# Patient Record
Sex: FEMALE | Race: WHITE | NOT HISPANIC OR LATINO | Employment: UNEMPLOYED | URBAN - METROPOLITAN AREA
[De-identification: names, ages, dates, MRNs, and addresses within clinical notes are randomized per-mention and may not be internally consistent; named-entity substitution may affect disease eponyms.]

---

## 2023-11-01 ENCOUNTER — MEDICAL CORRESPONDENCE (OUTPATIENT)
Dept: HEALTH INFORMATION MANAGEMENT | Facility: CLINIC | Age: 63
End: 2023-11-01
Payer: COMMERCIAL

## 2023-11-05 ENCOUNTER — HOSPITAL ENCOUNTER (EMERGENCY)
Facility: CLINIC | Age: 63
Discharge: HOME | End: 2023-11-05
Attending: EMERGENCY MEDICINE | Admitting: EMERGENCY MEDICINE
Payer: COMMERCIAL

## 2023-11-05 VITALS
SYSTOLIC BLOOD PRESSURE: 149 MMHG | HEART RATE: 78 BPM | DIASTOLIC BLOOD PRESSURE: 79 MMHG | RESPIRATION RATE: 16 BRPM | TEMPERATURE: 98.1 F | OXYGEN SATURATION: 98 %

## 2023-11-05 DIAGNOSIS — F41.9 ANXIETY: ICD-10-CM

## 2023-11-05 PROBLEM — M81.0 AGE-RELATED OSTEOPOROSIS WITHOUT CURRENT PATHOLOGICAL FRACTURE: Status: ACTIVE | Noted: 2022-05-26

## 2023-11-05 PROBLEM — F32.A DEPRESSION: Status: ACTIVE | Noted: 2023-07-17

## 2023-11-05 PROBLEM — Z86.718 PERSONAL HISTORY OF DVT (DEEP VEIN THROMBOSIS): Status: ACTIVE | Noted: 2021-05-07

## 2023-11-05 PROBLEM — F33.42 MAJOR DEPRESSIVE DISORDER, RECURRENT EPISODE, IN FULL REMISSION (H): Status: ACTIVE | Noted: 2023-11-05

## 2023-11-05 PROBLEM — D12.6 TUBULAR ADENOMA OF COLON: Status: ACTIVE | Noted: 2021-01-26

## 2023-11-05 PROBLEM — F33.2 SEVERE EPISODE OF RECURRENT MAJOR DEPRESSIVE DISORDER (H): Status: ACTIVE | Noted: 2020-01-08

## 2023-11-05 PROBLEM — F32.9 MAJOR DEPRESSIVE DISORDER WITH SINGLE EPISODE: Status: ACTIVE | Noted: 2023-08-01

## 2023-11-05 PROBLEM — F41.0 PANIC DISORDER: Status: ACTIVE | Noted: 2023-07-13

## 2023-11-05 PROCEDURE — 99283 EMERGENCY DEPT VISIT LOW MDM: CPT | Performed by: EMERGENCY MEDICINE

## 2023-11-05 PROCEDURE — 99283 EMERGENCY DEPT VISIT LOW MDM: CPT | Mod: GC | Performed by: EMERGENCY MEDICINE

## 2023-11-05 RX ORDER — CALCIUM CARBONATE 600 MG
TABLET ORAL DAILY
COMMUNITY

## 2023-11-05 RX ORDER — OLANZAPINE 5 MG/1
5 TABLET ORAL AT BEDTIME
COMMUNITY

## 2023-11-05 RX ORDER — SERTRALINE HYDROCHLORIDE 100 MG/1
100 TABLET, FILM COATED ORAL 2 TIMES DAILY
COMMUNITY

## 2023-11-05 RX ORDER — ALENDRONATE SODIUM 70 MG/1
70 TABLET ORAL
COMMUNITY
Start: 2023-08-15

## 2023-11-05 ASSESSMENT — ACTIVITIES OF DAILY LIVING (ADL)
ADLS_ACUITY_SCORE: 35
ADLS_ACUITY_SCORE: 35

## 2023-11-05 ASSESSMENT — ENCOUNTER SYMPTOMS: NERVOUS/ANXIOUS: 1

## 2023-11-05 NOTE — ED NOTES
Bed: URE-A  Expected date: 11/5/23  Expected time:   Means of arrival:   Comments:  N729 62 y/o female anxiety

## 2023-11-05 NOTE — DISCHARGE INSTRUCTIONS
"Aftercare Plan  If I am feeling unsafe or I am in a crisis, I will:   Contact my established care providers   Call the National Suicide Prevention Lifeline: 988  Go to the nearest emergency room   Call 911     Warning signs that I or other people might notice when a crisis is developing for me: panic becomes overwhelming and uncontrollable    Things I am able to do on my own to cope or help me feel better: call my sister     Changes I can make to support my mental health and wellness: follow through with the steps outlined below     People in my life that I can ask for help: my sister, group home staff     Your Counts include 234 beds at the [facility] has a mental health crisis team you can call 24/7: [facility] Mobile Crisis  [phone]     Other things that are important when I'm in crisis: I need help to slow things down so I can identify solutions.  If I have another panic attack I will utilize my PRN.  If I still feel that the panic is unbearable I will call 911 again or I will call the crisis line noted above.    Monday 11/6/23 I will:  Talk to the nurse at the group [place]  Ask her to help me contact the following people:  Dr. Puma Chase who both at [facility] to discuss medication adjustment/evaluation for help with food issue.  I will ask for a referral for a therapist also.  Talk to \"Herson\" at the [facility] for help with contacting medical assistance office about my application       Crisis Lines  Crisis Text Line  Text 450114  You will be connected with a trained live crisis counselor to provide support.    Por espanol, texto  FERCHO a 476217 o texto a 442-AYUDAME en WhatsApp    The [employer] (LGBTQ Youth Crisis Line)  7.330.705.8202  text START to 260-807      Community Resources  Fast Tracker  Linking people to mental health and substance use disorder resources  [employer]ymn.org     [place] Mental Health Warm Line  Peer to peer support  Monday thru Saturday, 12 pm to 10 pm  908.322.2123 or 1.742.158.9656  " "Text \"Support\" to 54153    National [place] [employer] Mental Illness (BROOKE)  486.659.4416 or 1.888.BROOKE.HELPS      Mental Health Apps  My3  https://ClassWallet.org/    VirtualHopeBox  https://Ryan/apps/virtual-hope-box/      Additional Information  Today you were seen by a licensed mental health professional through Triage and Transition services, [facility] ([facility])  for a crisis assessment in the Emergency Department at [facility].  It is recommended that you follow up with your established providers (psychiatrist, mental health therapist, and/or primary care doctor - as relevant) as soon as possible. Coordinators from [facility] will be calling you in the next 24-48 hours to ensure that you have the resources you need.  You can also contact [facility] coordinators directly at [phone]. You may have been scheduled for or offered an appointment with a mental health provider. [facility] maintains an extensive network of licensed behavioral health providers to connect patients with the services they need.  We do not charge providers a fee to participate in our referral network.  We match patients with providers based on a patient's specific needs, insurance coverage, and location.  Our first effort will be to refer you to a provider within your care system, and will utilize providers outside your care system as needed.         "

## 2023-11-05 NOTE — CONSULTS
"Diagnostic Evaluation Consultation  Crisis Assessment    Patient Name: Toyin Ruiz  Age:  63 year old  Legal Sex: female  Gender Identity: female  Pronouns:   Race: White  Ethnicity: Not  or   Language: English      Patient was assessed: In person      Patient location: [facility] EMERGENCY DEPARTMENT                             URE-A    Referral Data and Chief Complaint  Toyin Ruiz presents to the ED via EMS. Patient is presenting to the ED for the following concerns: Anxiety, Paranoia.   Factors that make the mental health crisis life threatening or complex are:  Pt presents to the ED today due to uncontrollable anxiety/panic attack.  She reports that she is \"worried about everything\" and outlines several presenting issues:  she is fearful that her application for medical assistance was filled out inaccurately and suspects that she has been disqualified.  Pt states that she \"does not like food\" stating that she has hunger pains, but when she eats those pains only get worse.  At the same time, she states that she hates food and she is terrified that she is going to die because she does not eat enough.  She is very paranoid that when she is discharged from the group home, next week, she will die because she does not like to cook, and food does not taste good to her.  Pt reports that \"at least at the group home the meals are balanced and prepared for you, so you don't have to think about it.\"  Pt states that she does not have anyone to help her, stating that she is \"all alone.\"  Pt denies SI/HI/SIB at this time, reporting that she is \"terrified of death.\"  Pt denies AH/VH.  Pt also reports that she is \"going to run out of money in 10 months\" and she does not know how to access funds that may be available to her.  She states that she is \"terrified of technology\" and that she does not \"know how to do anything, and everything is automated.\"  Pt very disorganized, thought processes " "tangential.  At this time, pt does not believe that hospitalization is necessary and she is questioning \"why did I even come here, you can't help me.\".      Informed Consent and Assessment Methods  Explained the crisis assessment process, including applicable information disclosures and limits to confidentiality, assessed understanding of the process, and obtained consent to proceed with the assessment.  Assessment methods included conducting a formal interview with patient, review of medical records, collaboration with medical staff, and obtaining relevant collateral information from family and community providers when available.  : done     Patient response to interventions: verbalizes understanding (After much time and repetitiveness pt was receptive to 4 steps laid out in Aftercare plan for discharge.)  Coping skills were attempted to reduce the crisis:  Pt utilized PRN     History of the Crisis   Pt has a history of ERMA, MDD, and panic disorder.  She is currently living in a transitional group home as a step down from inpatient mental health treatment at [facility].  She was a pt there July-August 2023.  Pt will be discharged from the group home next week and she has been extremely anxious about discharge because she will be living alone and does not have confidence in her abililty to live independently.  During the interview, pt was very negative, disputing any solution writer presented to her.  Physical presentation would indicate that she was very anxious, wringing her hands, rubbing her forhead, crossing and uncrossing her legs continuously, stating over and over \"I don't know, I don't know I don't know.\"  She has been seen in the ED for similar concerns in July of this year which resulted in a two-day hospital stay.  Pt states that her sister is here from [place], and she wants pt to move to [place] so that she (sister) can help her.  Pt adamant that she will not be returning to [place], instead reports that " "her sister is \"not well, and I am afraid she is going to die.\"  Pt reports that she has her own home but she is fearful that she will lose it because she has become so overwhelmed with \"everything\" that she \"just let it all go\" and she has no idea where her mortgage papers are.  Pt clearly experiencing high levels of emotional distress and although she is able to identify triggers for her panic attacks, she refutes any solution or suggestion for assistance that is offered.    Brief Psychosocial History  Family:  Single, Children no  Support System:  Sibling(s)  Employment Status:  unemployed  Source of Income:  social security  Financial Environmental Concerns:     Current Hobbies:   (Pt reports she does not like to do anything, everything is frustrating for her.)  Barriers in Personal Life:  mental health concerns    Significant Clinical History  Current Anxiety Symptoms:  panic attack, excessive worry, anxious  Current Depression/Trauma:  sense of doom, helplessness, hopelessness, negativistic  Current Somatic Symptoms:  anxious  Current Psychosis/Thought Disturbance:  agitation  Current Eating Symptoms:   (Pt reports that she does not like food and she is afraid she is going to die.)  Chemical Use History:  Alcohol: None  Benzodiazepines: None  Opiates: None  Cocaine: None  Marijuana: None  Other Use: None  Withdrawal Symptoms: Tremors   Past diagnosis:  Depression, Suicide attempt(s)  Family history:  No known history of mental health or chemical health concerns  Past treatment:  Inpatient Hospitalization, Supportive Living Environment (group home, intermediate house, etc)  Details of most recent treatment:  Pt was at [facility] July-August 2023.  Other relevant history:          Collateral Information  Is there collateral information: No     Collateral information name, relationship, phone number:       What happened today:       What is different about patient's functioning:       Concern about alcohol/drug use:  "     What do you think the patient needs:      Has patient made comments about wanting to kill themselves/others:      If d/c is recommended, can they take part in safety/aftercare planning:       Additional collateral information:   Pt refused.     Risk Assessment  [place] Suicide Severity Rating Scale Full Clinical Version:  Suicidal Ideation  Q1 Wish to be Dead (Lifetime): Yes  Q2 Non-Specific Active Suicidal Thoughts (Lifetime): Yes  3. Active Suicidal Ideation with any Methods (Not Plan) Without Intent to Act (Lifetime): No  Q4 Active Suicidal Ideation with Some Intent to Act, Without Specific Plan (Lifetime): No  Q5 Active Suicidal Ideation with Specific Plan and Intent (Lifetime): Yes  Q6 Suicide Behavior (Lifetime): yes (Pt sat in her tub for 4 hours and tried to cut her wrists with dull razors.)     Suicidal Behavior (Lifetime)  Actual Attempt (Lifetime): Yes  Total Number of Actual Attempts (Lifetime): 1  Actual Attempt Description (Lifetime): Pt tried to cut her wrist but the razor was too dull  Has subject engaged in non-suicidal self-injurious behavior? (Lifetime): No  Interrupted Attempts (Lifetime): No  Aborted or Self-Interrupted Attempt (Lifetime): No  Preparatory Acts or Behavior (Lifetime): No    [place] Suicide Severity Rating Scale Recent:   Suicidal Ideation (Recent)  Q1 Wished to be Dead (Past Month): no  Q2 Suicidal Thoughts (Past Month): no  Intensity of Ideation (Recent)  Frequency (Past 1 Month): Less than once a week  Duration (Past 1 Month):  (NA)  Controllability (Past 1 Month):  (N/A)  Deterrents (Past 1 Month): Does not apply  Reasons for Ideation (Past 1 Month): Does not apply  Suicidal Behavior (Recent)  Actual Attempt (Past 3 Months): Yes  Actual Attempt Description (Past 3 Months): Pt attempted to cut her wrists July 2023  Has subject engaged in non-suicidal self-injurious behavior? (Past 3 Months): No  Interrupted Attempts (Past 3 Months): No  Aborted or Self-Interrupted Attempt  (Past 3 Months): No  Preparatory Acts or Behavior (Past 3 Months): No    Environmental or Psychosocial Events: loss of a loved one, helplessness/hopelessness, other life stressors  Protective Factors: Protective Factors: strong bond to family unit, community support, or employment    Does the patient have thoughts of harming others? Feels Like Hurting Others: no  Previous Attempt to Hurt Others: no  Current presentation: Irritable  Is the patient engaging in sexually inappropriate behavior?: no  Duty to warn initiated: no    Is the patient engaging in sexually inappropriate behavior?  no        Mental Status Exam   Affect: Dramatic  Appearance: Appropriate  Attention Span/Concentration:  (tangential)  Eye Contact: Variable    Fund of Knowledge: Appropriate   Language /Speech Content: Fluent  Language /Speech Volume: Soft  Language /Speech Rate/Productions: Articulate  Recent Memory: Intact  Remote Memory: Intact  Mood: Anxious, Sad  Orientation to Person: Yes   Orientation to Place: Yes  Orientation to Time of Day: Yes  Orientation to Date: Yes     Situation (Do they understand why they are here?): Yes  Psychomotor Behavior: Agitated  Thought Content: Paranoia  Thought Form: Flight of Ideas     Mini-Cog Assessment  Number of Words Recalled:    Clock-Drawing Test:     Three Item Recall:    Mini-Cog Total Score:       Medication  Psychotropic medications:   Medication Orders - Psychiatric (From admission, onward)      None             Current Care Team  Patient Care Team:  Diana Chase as PCP - General (Pediatrics)    Diagnosis  Patient Active Problem List   Diagnosis Code    Age-related osteoporosis without current pathological fracture M81.0    Allergic rhinitis J30.9    Anxiety state F41.1    Depression F32.A    History of ovarian cancer Z85.43    Major depressive disorder with single episode F32.9    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    Generalized anxiety disorder F41.1    Panic disorder F41.0     "Personal history of DVT (deep vein thrombosis) Z86.718    Right nasal polyps J33.9    Severe episode of recurrent major depressive disorder (H) F33.2    Tubular adenoma of colon D12.6    Major depressive disorder, recurrent episode, in full remission (H24) F33.42       Primary Problem This Admission  Active Hospital Problems    Major depressive disorder, recurrent episode, in full remission (H24)        Clinical Summary and Substantiation of Recommendations   Pt presents to the ED today for panic attack that became overwhelming despite use of PRN.  Pt denies SI/HI/SIB, AH/VH at this time.  Pt does not believe she needs to be hospitalized and is able to safety plan on some level in that she commits to informing staff at the group home should she begin to feel as anxious and overwhelmed as prior to calling 911 today.  Although pt is highly anxious throughout the interview, she does not meet criteria for admission.  Pt stated over and over that she \"does not like food\" and that she is fearful that she is going to die because she has \"pain\" like hunger pains, that \"do not go away.\" Pt emotive as she described several issues that she is currently experiencing.  Pt states that she is \"terrified of technology\" and recognizes that one must be able to navigate technology in today's world.  Pt carries past diagnosis of ERMA, Panic Attack and MDD, presentation today would support those diagnosis and even suggest Bipolar with manic tendencies.  Pt endorsing feelings of helplessnes and hopelessness and disputes any suggestion/solution offered by this writer.  She reports that she does feel safe at the group home and notes that she will call 911 should she feel desparate and overwhelmed again.  Pt was only somewhat receptive to suggestions posed by writer with much coaxing and repetitiveness.  At that time she had calmed down enough to repeat and agree to plan laid out in discharge instructions.       Patient coping skills " attempted to reduce the crisis:  Pt utilized PRN    Disposition  Recommended disposition: Medication Management, Group Home, Other. please comment (Pt to return to group home with instructions to contact/follow up with PCP Monday 11/6/23.)        Reviewed case and recommendations with attending provider. Attending Name: Dr. Kaleb Ruffin       Attending concurs with disposition: yes       Patient and/or validated legal guardian concurs with disposition:   yes       Final disposition:  discharge    Legal status on admission:      Assessment Details   Total duration spent with the patient: 55 min     CPT code(s) utilized: 00145 - Psychotherapy for Crisis - 60 (30-74*) min    GÓMEZ Arenas, Psychotherapist  [facility] - Triage & Transition Services  Callback: [phone]

## 2023-11-05 NOTE — ED PROVIDER NOTES
ED Provider Note  [facility]      History     Chief Complaint   Patient presents with    Anxiety      from group home staff called for increasing anxiety  Pt is going to be discharged from group home in 1 week  On arrival she was bouncing around shaking  At 1130 group home gave her 5 mg of zyprexa   Pt is worried about everything. No food no money.      Mental Health Problem       Anxiety    Mental Health Problem  Associated symptoms: anxiety      Toyin Ruiz is a 63 year old female who presents to the ED with complaints of anxiety.  Past medical history notable for ERMA, depression, panic disorder, DVT.  She presents after a panic attack at her group home just prior to arrival.  She has many life stressors that have been culminating over the past several months.  Her biggest anxiety-induced that she states is her inability to eat.  She states that she feels hunger and feels as though she has an appetite but food just does not interest her.  She has been eating as she is forcing herself.  She denies any nausea, vomiting, abdominal pain, fever, or chills.  She did eat a sandwich on arrival to the ED here and did eat breakfast as well.  She also reports stress with being discharged from her group home in 1 week.  She lives in the area alone and does not have any family or friends nearby.  She is stressed when it comes to technology and using computers as well as her financial situation.  She states that her medication assistance and soon and is anxious about stopping all of her medications cold turkey.  She states that all of her anxiety and recent group home admission was due to suicidal ideation after not being able to figure out how to get on a Zoom call to meet with her psychiatrist about 4 months ago.  She denies any SI, HI, or hallucinations at this time.  She denies any substance use or recent illness.  She did receive a p.o. Ativan prior to arrival for her panic attack.    Past  Medical History  No past medical history on file.  No past surgical history on file.  alendronate (FOSAMAX) 70 MG tablet  apixaban ANTICOAGULANT (ELIQUIS) 5 MG tablet  Calcium Carbonate-Vit D-Min (RA CALCIUM 600/VIT D/MINERALS) 600-200 MG-UNIT TABS  OLANZapine (ZYPREXA) 5 MG tablet  sertraline (ZOLOFT) 100 MG tablet      Allergies   Allergen Reactions    Penicillins Unknown     Family History  No family history on file.  Social History       Past medical history, past surgical history, medications, allergies, family history, and social history were reviewed with the patient. No additional pertinent items.      A medically appropriate review of systems was performed with pertinent positives and negatives noted in the HPI, and all other systems negative.    Physical Exam   BP: (!) 144/88  Pulse: 79  Temp: 98.1  F (36.7  C)  Resp: 16  SpO2: 97 %  Physical Exam  Constitutional:       General: She is not in acute distress.     Appearance: Normal appearance. She is not ill-appearing, toxic-appearing or diaphoretic.   HENT:      Mouth/Throat:      Mouth: Mucous membranes are moist.      Pharynx: Oropharynx is clear.   Cardiovascular:      Rate and Rhythm: Normal rate and regular rhythm.   Pulmonary:      Effort: Pulmonary effort is normal.      Breath sounds: Normal breath sounds.   Abdominal:      General: Abdomen is flat. Bowel sounds are normal.      Palpations: Abdomen is soft.   Neurological:      General: No focal deficit present.      Mental Status: She is alert and oriented to person, place, and time. Mental status is at baseline.   Psychiatric:         Attention and Perception: Attention and perception normal. She does not perceive auditory or visual hallucinations.         Mood and Affect: Affect normal. Mood is anxious.         Speech: Speech normal.         Behavior: Behavior normal. Behavior is cooperative.         Thought Content: Thought content normal. Thought content is not paranoid or delusional. Thought  content does not include homicidal or suicidal ideation. Thought content does not include homicidal or suicidal plan.         Cognition and Memory: Cognition and memory normal.           ED Course, Procedures, & Data      Procedures          Mental Health Risk Assessment        PSS-3      Date and Time Over the past 2 weeks have you felt down, depressed, or hopeless? Over the past 2 weeks have you had thoughts of killing yourself? Have you ever attempted to kill yourself? When did this last happen? User   11/05/23 1302 no no yes more than 6 months ago LM          C-SSRS ([place])      Date and Time Q1 Wished to be Dead (Past Month) Q2 Suicidal Thoughts (Past Month) Q3 Suicidal Thought Method Q4 Suicidal Intent without Specific Plan Q5 Suicide Intent with Specific Plan Q6 Suicide Behavior (Lifetime) Within the Past 3 Months? RETIRED: Level of Risk per Screen Screening Not Complete User   11/05/23 1633 no no -- -- -- -- -- -- -- SD   11/05/23 1631 -- -- -- -- -- yes -- -- -- SD                Suicide assessment completed by mental health (D.E.C., LCSW, etc.)       No results found for any visits on 11/05/23.  Medications - No data to display  Labs Ordered and Resulted from Time of ED Arrival to Time of ED Departure - No data to display  No orders to display          Critical care was not performed.     Medical Decision Making  The patient's presentation was of moderate complexity (a chronic illness mild to moderate exacerbation, progression, or side effect of treatment).    The patient's evaluation involved:  history and exam without other MDM data elements    The patient's management necessitated only low risk treatment.    Assessment & Plan    Toyin is a 63-year-old female that presented from her group home for acute anxiety attack.  Acceptable vital signs.  Patient in no acute distress and nontoxic-appearing.  Patient eating appropriately in the ED with no reactive symptoms.  DEC  evaluated the patient  in the ED and reported patient is suitable for continued outpatient treatment and management of her symptoms.  Patient voiced agreeability to this plan and to discharge back to the group home.  Patient stable for discharge home from the ED.  Strict return precautions given.  Follow-up with mental health provider closely this week.  Patient was agreeable to discharge back to the group home and all questions were answered prior to departure from the ED.    I have reviewed the nursing notes. I have reviewed the findings, diagnosis, plan and need for follow up with the patient.    Discharge Medication List as of 11/5/2023  4:32 PM          Final diagnoses:   Anxiety     NICHOLAS Ta  [facility] EMERGENCY DEPARTMENT  11/5/2023     Yulissa Romero PA-C  11/05/23 1650    ED Attending Physician Attestation    I Kaleb Ruffin DO, cared for this patient with the Advanced Practice Provider (MIRIAN). I have performed a history and physical examination of the patient independent of the MIRIAN. I reviewed the MIRIAN's documentation above and agree with the documented findings and plan of care. I personally provided a substantive portion of the care for this patient.    I personally performed the substantive portion of the medical decision making for this visit - please see the MIRIAN's documentation for full details.      I personally performed the substantive portion of the history for this visit - please see the MIRIAN's documentation for full details.      I personally performed the substantive portion of the physical exam - please see the MIRIAN's documentation for full details.      Summary of HPI, PE, ED Course   Patient  evaluated in the emergency department for anxiety..  Patient was evaluated by myself as well as the PA and the mental health .  She does not meet criteria for hospitalization.  She is not an active danger to herself or others.  We will discharge her and have  her follow-up with her usual care team.  Extremities were discussed by the mental health .  Critical Care & Procedures  Not applicable.    Medical Decision Making  The medical record was reviewed and interpreted.  Current labs reviewed and interpreted.  Previous labs reviewed and interpreted.      Kaleb Ruffin DO  Emergency Medicine          Kaleb Ruffin DO  11/07/23 0139

## 2023-11-05 NOTE — ED TRIAGE NOTES
Panic attach at group home. Does not like food.     Triage Assessment (Adult)       Row Name 11/05/23 8162          Triage Assessment    Airway WDL WDL        Respiratory WDL    Respiratory WDL WDL        Skin Circulation/Temperature WDL    Skin Circulation/Temperature WDL WDL        Cardiac WDL    Cardiac WDL WDL        Peripheral/Neurovascular WDL    Peripheral Neurovascular WDL WDL        Cognitive/Neuro/Behavioral WDL    Cognitive/Neuro/Behavioral WDL WDL